# Patient Record
Sex: FEMALE | Race: WHITE | Employment: FULL TIME | URBAN - METROPOLITAN AREA
[De-identification: names, ages, dates, MRNs, and addresses within clinical notes are randomized per-mention and may not be internally consistent; named-entity substitution may affect disease eponyms.]

---

## 2017-12-18 ENCOUNTER — HOSPITAL ENCOUNTER (EMERGENCY)
Age: 47
Discharge: HOME | End: 2017-12-18
Payer: COMMERCIAL

## 2017-12-18 VITALS
OXYGEN SATURATION: 99 % | SYSTOLIC BLOOD PRESSURE: 144 MMHG | DIASTOLIC BLOOD PRESSURE: 90 MMHG | RESPIRATION RATE: 16 BRPM | TEMPERATURE: 97.4 F | HEART RATE: 82 BPM

## 2017-12-18 DIAGNOSIS — R21 RASH AND OTHER NONSPECIFIC SKIN ERUPTION: Primary | ICD-10-CM

## 2017-12-18 PROCEDURE — 99282 EMERGENCY DEPT VISIT SF MDM: CPT

## 2017-12-18 PROCEDURE — 6370000000 HC RX 637 (ALT 250 FOR IP): Performed by: PHYSICIAN ASSISTANT

## 2017-12-18 RX ORDER — PREDNISONE 10 MG/1
40 TABLET ORAL ONCE
Status: COMPLETED | OUTPATIENT
Start: 2017-12-18 | End: 2017-12-18

## 2017-12-18 RX ORDER — DIPHENHYDRAMINE HCL 25 MG
25 CAPSULE ORAL EVERY 6 HOURS PRN
Qty: 30 CAPSULE | Refills: 0 | Status: SHIPPED | OUTPATIENT
Start: 2017-12-18 | End: 2017-12-28

## 2017-12-18 RX ORDER — PREDNISONE 20 MG/1
40 TABLET ORAL DAILY
Qty: 8 TABLET | Refills: 0 | Status: SHIPPED | OUTPATIENT
Start: 2017-12-18 | End: 2017-12-28

## 2017-12-18 RX ADMIN — PREDNISONE 40 MG: 10 TABLET ORAL at 16:52

## 2017-12-18 ASSESSMENT — ENCOUNTER SYMPTOMS
GASTROINTESTINAL NEGATIVE: 1
RESPIRATORY NEGATIVE: 1
EYES NEGATIVE: 1

## 2017-12-18 NOTE — ED TRIAGE NOTES
Pt a/ox3 skin w  D intact pulse strong and regular light pink rash noted on neck and forearm  Pt shows no distress

## 2017-12-18 NOTE — ED PROVIDER NOTES
[address] [facility] ED  eMERGENCY dEPARTMENT eNCOUnter      Pt Name: Ida Spencer  MRN: [id number]  [place] 1970  Date of evaluation: 12/18/2017  Provider: Quentin Kellogg PA-C      HISTORY OF PRESENT ILLNESS    Ida Spencer is a 52 y.o. female who presents to the Emergency Department with Chief complaint of a rash on her neck and bilateral upper extremities. Patient states she did use a new detergent as well as facial lotion prior to onset of rash. She denies any new foods or medications. Patient states she does have a history of eczema and states in the wintertime she would occasionally break out in hives due to dry skin. Patient states she has not had any recent issues with this type of rash. She denies any fever and has no other complaints at this time. REVIEW OF SYSTEMS       Review of Systems   Constitutional: Negative. HENT: Negative. Eyes: Negative. Respiratory: Negative. Cardiovascular: Negative. Gastrointestinal: Negative. Endocrine: Negative. Genitourinary: Negative. Musculoskeletal: Negative. Skin: Positive for rash. Neurological: Negative. Psychiatric/Behavioral: Negative. PAST MEDICAL HISTORY   No past medical history on file. SURGICAL HISTORY     No past surgical history on file. CURRENT MEDICATIONS       Previous Medications    No medications on file       ALLERGIES     Review of patient's allergies indicates no known allergies. FAMILY HISTORY     No family history on file.        SOCIAL HISTORY       Social History     Social History    Marital status:      Spouse name: N/A    Number of children: N/A    Years of education: N/A     Social History Main Topics    Smoking status: Not on file    Smokeless tobacco: Not on file    Alcohol use Not on file    Drug use: Unknown    Sexual activity: Not on file     Other Topics Concern    Not on file     Social History Narrative    No narrative on file SCREENINGS             PHYSICAL EXAM    (up to 7 for level 4, 8 or more for level 5)     ED Triage Vitals [12/18/17 1611]   BP Temp Temp Source Pulse Resp SpO2 Height Weight   (!) 144/90 97.4 °F (36.3 °C) Oral 82 16 99 % -- --       Physical Exam   Constitutional: She is oriented to person, place, and time. She appears well-developed and well-nourished. No distress. HENT:   Head: Normocephalic and atraumatic. Right Ear: Tympanic membrane is not erythematous. Left Ear: Tympanic membrane is not erythematous. Nose: No rhinorrhea. Mouth/Throat: No oropharyngeal exudate, posterior oropharyngeal edema, posterior oropharyngeal erythema or tonsillar abscesses. Eyes: Conjunctivae are normal. Pupils are equal, round, and reactive to light. Neck: Normal range of motion. Neck supple. Cardiovascular: Normal rate and regular rhythm. No murmur heard. Pulmonary/Chest: Breath sounds normal. No respiratory distress. She has no wheezes. She has no rales. Abdominal: Soft. She exhibits no distension. There is no tenderness. Musculoskeletal: Normal range of motion. Neurological: She is alert and oriented to person, place, and time. No cranial nerve deficit. Skin: Skin is warm and dry. No rash noted. There is erythema. Generalized fine erythematous rash to neck and bilateral upper extremities. Patient states it is itching in nature. Psychiatric: She has a normal mood and affect. Judgment normal.         All other labs were within normal range or not returned as of this dictation. EMERGENCY DEPARTMENT COURSE and DIFFERENTIAL DIAGNOSIS/MDM:   Vitals:    Vitals:    12/18/17 1611   BP: (!) 144/90   Pulse: 82   Resp: 16   Temp: 97.4 °F (36.3 °C)   TempSrc: Oral   SpO2: 99%       ED Course        Patient took Benadryl prior to arrival.  She was additionally given prednisone while in the emergency department. She is to remain on these medications for the next few days for treatment.   She is to follow up with family doctor as well as dermatology if symptoms persist.  She is instructed not to use the new detergent or face lotion in case these were the causative agent. Patient verbalizes understanding discharge and as no further questions. PROCEDURES:  Unless otherwise noted below, none     Procedures      FINAL IMPRESSION      1.  Rash and other nonspecific skin eruption          DISPOSITION/PLAN   DISPOSITION Decision to Discharge        Stephen Merritt PA-C (electronically signed)  Attending Emergency Physician  [facility]  12/18/17 1733 Elham Riojas PA-C  12/18/17 [id number]

## 2023-02-04 PROBLEM — N95.1 HOT FLASH, MENOPAUSAL: Status: ACTIVE | Noted: 2023-02-04

## 2023-02-04 PROBLEM — N28.9 KIDNEY LESION: Status: ACTIVE | Noted: 2023-02-04

## 2023-02-04 PROBLEM — L30.9 ECZEMA: Status: ACTIVE | Noted: 2023-02-04

## 2023-02-04 PROBLEM — I70.90 ATHEROSCLEROSIS: Status: ACTIVE | Noted: 2023-02-04

## 2023-02-04 PROBLEM — R73.03 PREDIABETES: Status: ACTIVE | Noted: 2023-02-04

## 2023-02-04 PROBLEM — S31.105A OPEN WOUND OF UMBILICAL REGION: Status: ACTIVE | Noted: 2023-02-04

## 2023-02-04 PROBLEM — Q64.4: Status: ACTIVE | Noted: 2023-02-04

## 2023-02-04 PROBLEM — R07.89 ATYPICAL CHEST PAIN: Status: ACTIVE | Noted: 2023-02-04

## 2023-02-04 PROBLEM — M25.531 ACUTE PAIN OF RIGHT WRIST: Status: ACTIVE | Noted: 2023-02-04

## 2023-02-04 PROBLEM — Q96.9 TURNER SYNDROME (HHS-HCC): Status: ACTIVE | Noted: 2023-02-04

## 2023-02-04 PROBLEM — J43.9 EMPHYSEMA LUNG (MULTI): Status: ACTIVE | Noted: 2023-02-04

## 2023-02-04 PROBLEM — K21.9 GASTROESOPHAGEAL REFLUX DISEASE WITHOUT ESOPHAGITIS: Status: ACTIVE | Noted: 2023-02-04

## 2023-02-04 PROBLEM — F43.20 GRIEF REACTION: Status: ACTIVE | Noted: 2023-02-04

## 2023-02-04 PROBLEM — F43.21 GRIEF REACTION: Status: ACTIVE | Noted: 2023-02-04

## 2023-02-04 PROBLEM — R79.89 ELEVATED LFTS: Status: ACTIVE | Noted: 2023-02-04

## 2023-02-04 PROBLEM — E78.2 MIXED HYPERLIPIDEMIA: Status: ACTIVE | Noted: 2023-02-04

## 2023-02-04 RX ORDER — MOMETASONE FUROATE 1 MG/G
OINTMENT TOPICAL
COMMUNITY
Start: 2020-01-17

## 2023-02-04 RX ORDER — ATORVASTATIN CALCIUM 20 MG/1
40 TABLET, FILM COATED ORAL NIGHTLY
COMMUNITY
Start: 2020-05-18 | End: 2023-08-21 | Stop reason: DRUGHIGH

## 2023-02-04 RX ORDER — DICLOFENAC SODIUM 10 MG/G
GEL TOPICAL
COMMUNITY
End: 2024-04-01 | Stop reason: WASHOUT

## 2023-02-04 RX ORDER — SERTRALINE HYDROCHLORIDE 25 MG/1
1 TABLET, FILM COATED ORAL DAILY
COMMUNITY
Start: 2021-11-09 | End: 2024-02-21 | Stop reason: WASHOUT

## 2023-03-20 ENCOUNTER — OFFICE VISIT (OUTPATIENT)
Dept: PRIMARY CARE | Facility: CLINIC | Age: 53
End: 2023-03-20
Payer: COMMERCIAL

## 2023-03-20 VITALS
TEMPERATURE: 98.4 F | OXYGEN SATURATION: 98 % | DIASTOLIC BLOOD PRESSURE: 60 MMHG | HEIGHT: 62 IN | RESPIRATION RATE: 16 BRPM | WEIGHT: 130 LBS | BODY MASS INDEX: 23.92 KG/M2 | HEART RATE: 71 BPM | SYSTOLIC BLOOD PRESSURE: 128 MMHG

## 2023-03-20 DIAGNOSIS — Q96.9 TURNER SYNDROME (HHS-HCC): ICD-10-CM

## 2023-03-20 DIAGNOSIS — E78.2 MIXED HYPERLIPIDEMIA: ICD-10-CM

## 2023-03-20 DIAGNOSIS — Z12.11 COLON CANCER SCREENING: ICD-10-CM

## 2023-03-20 DIAGNOSIS — R73.03 PREDIABETES: ICD-10-CM

## 2023-03-20 DIAGNOSIS — J43.8 OTHER EMPHYSEMA (MULTI): Primary | ICD-10-CM

## 2023-03-20 PROBLEM — D22.9 ATYPICAL MOLE: Status: ACTIVE | Noted: 2023-03-20

## 2023-03-20 PROBLEM — R19.8 UMBILICAL DISCHARGE: Status: ACTIVE | Noted: 2023-03-20

## 2023-03-20 PROCEDURE — 99214 OFFICE O/P EST MOD 30 MIN: CPT | Performed by: FAMILY MEDICINE

## 2023-03-20 PROCEDURE — 1036F TOBACCO NON-USER: CPT | Performed by: FAMILY MEDICINE

## 2023-03-20 RX ORDER — METFORMIN HYDROCHLORIDE 500 MG/1
500 TABLET ORAL
Qty: 90 TABLET | Refills: 3 | Status: SHIPPED | OUTPATIENT
Start: 2023-03-20 | End: 2024-01-31

## 2023-03-20 NOTE — PROGRESS NOTES
C19: [id number]  Flu: DECLINED  mamm: 11/2021-ordered in [facility]  pap: 2019  lmp: 2016     - Kidney MRI showed 8mm proteinaceous cyst  - Wrist MRI showed tenosynovitis and edema. Wrist pain is nearly gone but still has some swelling. Diclofenac gel helps a little and tylenol helps a lot. Will see ortho on April 3rd for this.  - Gonna see Dr. Morillo (cards) again tomorrow for a holter monitor due to palpitations. Has these while settling into bed at night. Go away on their own after a few minutes.  - Last DEXA was a long time ago. It was normal then. She is not on any hormonal therapy. Not having post menopausal symptoms. No longer following with endocrinology. Not following with obgyn.  - Mammo scheduled for April 11th    [ ] ? Dexa

## 2023-03-20 NOTE — PROGRESS NOTES
Subjective   Patient ID: Gissel Borrero is a 52 y.o. female who presents for Results (Discuss test results - MRI).  C19: [id number]  Flu: DECLINED  mamm: 11/2021-ordered in [facility]  pap: 2019  lmp: 2016      - Kidney MRI showed 8mm proteinaceous cyst  - Wrist MRI showed tenosynovitis and edema. Wrist pain is nearly gone but still has some swelling. Diclofenac gel helps a little and tylenol helps a lot. Will see ortho on April 3rd for this.  - Going to see Dr. Morillo (cards) again tomorrow for a holter monitor due to palpitations. Has these while settling into bed at night. Go away on their own after a few minutes.  - Last DEXA was a long time ago. It was normal then. She is not on any hormonal therapy. Not having post menopausal symptoms. No longer following with endocrinology. Not following with obgyn.  - Mammo scheduled for April 11th  Pt seen and agree w dr jeter findings    HPI  Patient Active Problem List   Diagnosis    Eczema    Elevated LFTs    Gastroesophageal reflux disease without esophagitis    Grief reaction    Hot flash, menopausal    Mixed hyperlipidemia    Open wound of umbilical region    Nicole syndrome    Urachal sinus    Acute pain of right wrist    Atherosclerosis    Atypical chest pain    Emphysema lung (CMS/HCC)    Kidney lesion    Prediabetes    Atypical mole    B12 deficiency    Gonadal dysgenesis    Migraines    Umbilical discharge    Vitamin D deficiency       Past Surgical History:   Procedure Laterality Date    PELVIC LAPAROSCOPY  11/18/2022    Laparoscopy       Review of Systems  This patient has   NO history of recent Covid nor flu symptoms,  NO Fever nor chills,  NO Chest pain, shortness of breath nor paroxysmal nocturnal dyspnea,  NO Nausea, vomiting, nor diarrhea,  NO Hematochezia nor melena,  NO Dysuria, hematuria, nor new incontinence issues  NO new severe headaches nor neurological complaints,  NO new issues with anxiety nor depression nor new psychiatric complaints,  NO suicidal nor  "homicidal ideations.     OBJECTIVE:  /60 (BP Location: Left arm, Patient Position: Sitting, BP Cuff Size: Adult)   Pulse 71   Temp 36.9 °C (98.4 °F) (Temporal)   Resp 16   Ht 1.575 m (5' 2\")   Wt 59 kg (130 lb)   SpO2 98%   BMI 23.78 kg/m²      General:  alert, oriented, no acute distress.  No obvious skin rashes noted.   No gait disturbance noted.    Mood is pleasant, not tearful, no signs of emotional distress.  Not appearing intoxicated or altered.   No voiced delusions,   Normal, appropriate behavior.    HEENT: Normocephalic, atraumatic,   Pupils round, reactive to light  Extraocular motions intact and wnl  Tympanic membranes normal    Neck: no nuchal rigidity  No masses palpable.  No carotid bruits.  No thyromegaly.    Respiratory: Equal breath sounds  No wheezes,    rales,    nor rhonchi  No respiratory distress.    Heart: Regular rate and rhythm, no    murmurs  no rubs/gallops    Abdomen: no masses palpable, no rebound nor guarding, no rebound nor guarding.    Extremities: NO cyanosis noted, no clubbing.   No edema noted.  2+dorsalis pedis pulses.    Normal-not antalgic, steady gait.    Legacy Encounter on 01/27/2023   Component Date Value Ref Range Status    Glucose 01/27/2023 80  74 - 99 mg/dL Final    Sodium 01/27/2023 141  136 - 145 mmol/L Final    Potassium 01/27/2023 4.2  3.5 - 5.3 mmol/L Final    Chloride 01/27/2023 106  98 - 107 mmol/L Final    Bicarbonate 01/27/2023 27  21 - 32 mmol/L Final    Anion Gap 01/27/2023 12  10 - 20 mmol/L Final    Urea Nitrogen 01/27/2023 15  6 - 23 mg/dL Final    Creatinine 01/27/2023 0.59  0.50 - 1.05 mg/dL Final    GFR Female 01/27/2023 >90  >90 mL/min/1.73m2 Final    Comment:  CALCULATIONS OF ESTIMATED GFR ARE PERFORMED   USING THE 2021 CKD-EPI STUDY REFIT EQUATION   WITHOUT THE RACE VARIABLE FOR THE IDMS-TRACEABLE   CREATININE METHODS.    https://jasn.asnjournals.org/content/early/2021/09/22/ASN.4607510662      Calcium 01/27/2023 9.3  8.6 - 10.3 mg/dL " Final    Albumin 01/27/2023 4.4  3.4 - 5.0 g/dL Final    Alkaline Phosphatase 01/27/2023 120 (H)  33 - 110 U/L Final    Total Protein 01/27/2023 6.9  6.4 - 8.2 g/dL Final    AST 01/27/2023 16  9 - 39 U/L Final    Total Bilirubin 01/27/2023 0.5  0.0 - 1.2 mg/dL Final    ALT (SGPT) 01/27/2023 17  7 - 45 U/L Final    Comment:  Patients treated with Sulfasalazine may generate    falsely decreased results for ALT.      WBC 01/27/2023 5.9  4.4 - 11.3 x10E9/L Final    RBC 01/27/2023 4.47  4.00 - 5.20 x10E12/L Final    Hemoglobin 01/27/2023 12.5  12.0 - 16.0 g/dL Final    Hematocrit 01/27/2023 39.8  36.0 - 46.0 % Final    MCV 01/27/2023 89  80 - 100 fL Final    MCHC 01/27/2023 31.4 (L)  32.0 - 36.0 g/dL Final    Platelets 01/27/2023 261  150 - 450 x10E9/L Final    RDW 01/27/2023 14.3  11.5 - 14.5 % Final    Uric Acid 01/27/2023 4.3  2.3 - 6.7 mg/dL Final    Comment:  Venipuncture immediately after or during the    administration of Metamizole may lead to falsely   low results. Testing should be performed immediately   prior to Metamizole dosing.      Rheumatoid Factor 01/27/2023 11  0 - 15 IU/mL Final    ANTI-NUCLEAR ANTIBODY (GARRETT) 01/27/2023 NEGATIVE  NEGATIVE Final    Comment:   The Antinuclear Antibody (GARRETT) test was performed using    indirect immunofluorescence assay with HEp-2 cells slide.      Sedimentation Rate 01/27/2023 10  0 - 30 mm/h Final    Comment: Please note new reference ranges as of 5/9/2022.  Ran on alternate instrument    Reference Ranges:    Males: 0-15  Females: 0-20  Children under 18: 0-10      Hemoglobin A1C 01/27/2023 5.9 (A)  % Final    Comment:      Diagnosis of Diabetes-Adults   Non-Diabetic: < or = 5.6%   Increased risk for developing diabetes: 5.7-6.4%   Diagnostic of diabetes: > or = 6.5%  .       Monitoring of Diabetes                Age (y)     Therapeutic Goal (%)   Adults:          >18           <7.0   Pediatrics:    13-18           <7.5                   7-12           <8.0                    0- 6            7.5-8.5   American Diabetes Association. Diabetes Care 33(S1), Jan 2010.      Estimated Average Glucose 01/27/2023 123  MG/DL Final    Cholesterol 01/27/2023 209 (H)  0 - 199 mg/dL Final    Comment: .      AGE      DESIRABLE   BORDERLINE HIGH   HIGH     0-19 Y     0 - 169       170 - 199     >/= 200    20-24 Y     0 - 189       190 - 224     >/= 225         >24 Y     0 - 199       200 - 239     >/= 240   **All ranges are based on fasting samples. Specific   therapeutic targets will vary based on patient-specific   cardiac risk.  .   Pediatric guidelines reference:Pediatrics 2011, 128(S5).   Adult guidelines reference: NCEP ATPIII Guidelines,     RACHEL 2001, 258:5164-97  .   Venipuncture immediately after or during the    administration of Metamizole may lead to falsely   low results. Testing should be performed immediately   prior to Metamizole dosing.      HDL 01/27/2023 58.1  mg/dL Final    Comment: .      AGE      VERY LOW   LOW     NORMAL    HIGH       0-19 Y       < 35   < 40     40-45     ----    20-24 Y       ----   < 40       >45     ----      >24 Y       ----   < 40     40-60      >60  .      Cholesterol/HDL Ratio 01/27/2023 3.6   Final    Comment: REF VALUES  DESIRABLE  < 3.4  HIGH RISK  > 5.0      LDL 01/27/2023 125 (H)  0 - 99 mg/dL Final    Comment: .                           NEAR      BORD      AGE      DESIRABLE  OPTIMAL    HIGH     HIGH     VERY HIGH     0-19 Y     0 - 109     ---    110-129   >/= 130     ----    20-24 Y     0 - 119     ---    120-159   >/= 160     ----      >24 Y     0 -  99   100-129  130-159   160-189     >/=190  .      VLDL 01/27/2023 26  0 - 40 mg/dL Final    Triglycerides 01/27/2023 132  0 - 149 mg/dL Final    Comment: .      AGE      DESIRABLE   BORDERLINE HIGH   HIGH     VERY HIGH   0 D-90 D    19 - 174         ----         ----        ----  91 D- 9 Y     0 -  74        75 -  99     >/= 100      ----    10-19 Y     0 -  89        90 - 129     >/= 130       ----    20-24 Y     0 - 114       115 - 149     >/= 150      ----         >24 Y     0 - 149       150 - 199    200- 499    >/= 500  .   Venipuncture immediately after or during the    administration of Metamizole may lead to falsely   low results. Testing should be performed immediately   prior to Metamizole dosing.      A-1 Antitrypsin 01/27/2023 124  84 - 218 mg/dL Final        Assessment/Plan     Problem List Items Addressed This Visit          Respiratory    Emphysema lung (CMS/HCC) - Primary       Endocrine/Metabolic    Prediabetes       Other    Mixed hyperlipidemia    Nicole syndrome     Other Visit Diagnoses       Colon cancer screening        Relevant Orders    Cologuard® colon cancer screening            Mamm scheduled  Cologuard due OR scope-offered  Low carb diet    Offered metformin  Palpitations-having holter tomorrow   And follow up cardio 4-10  -6mo hba1c cmp  Having ccs likely soon  Ordered by cardio  Consider pulmonary  See me 4-6mo

## 2023-04-10 ENCOUNTER — TELEPHONE (OUTPATIENT)
Dept: PRIMARY CARE | Facility: CLINIC | Age: 53
End: 2023-04-10
Payer: COMMERCIAL

## 2023-04-13 LAB
ANION GAP IN SER/PLAS: 12 MMOL/L (ref 10–20)
CALCIUM (MG/DL) IN SER/PLAS: 8.7 MG/DL (ref 8.6–10.3)
CARBON DIOXIDE, TOTAL (MMOL/L) IN SER/PLAS: 30 MMOL/L (ref 21–32)
CHLORIDE (MMOL/L) IN SER/PLAS: 105 MMOL/L (ref 98–107)
CREATININE (MG/DL) IN SER/PLAS: 0.61 MG/DL (ref 0.5–1.05)
GFR FEMALE: >90 ML/MIN/1.73M2
GLUCOSE (MG/DL) IN SER/PLAS: 86 MG/DL (ref 74–99)
MAGNESIUM (MG/DL) IN SER/PLAS: 1.71 MG/DL (ref 1.6–2.4)
POTASSIUM (MMOL/L) IN SER/PLAS: 3.7 MMOL/L (ref 3.5–5.3)
SODIUM (MMOL/L) IN SER/PLAS: 143 MMOL/L (ref 136–145)
THYROTROPIN (MIU/L) IN SER/PLAS BY DETECTION LIMIT <= 0.05 MIU/L: 0.8 MIU/L (ref 0.44–3.98)
THYROXINE (T4) FREE (NG/DL) IN SER/PLAS: 0.99 NG/DL (ref 0.61–1.12)
UREA NITROGEN (MG/DL) IN SER/PLAS: 13 MG/DL (ref 6–23)

## 2023-08-01 PROBLEM — D22.9 ATYPICAL MOLE: Status: RESOLVED | Noted: 2023-03-20 | Resolved: 2023-08-01

## 2023-08-01 PROBLEM — M25.531 ACUTE PAIN OF RIGHT WRIST: Status: RESOLVED | Noted: 2023-02-04 | Resolved: 2023-08-01

## 2023-08-01 PROBLEM — R07.89 ATYPICAL CHEST PAIN: Status: RESOLVED | Noted: 2023-02-04 | Resolved: 2023-08-01

## 2023-08-01 PROBLEM — S31.105A OPEN WOUND OF UMBILICAL REGION: Status: RESOLVED | Noted: 2023-02-04 | Resolved: 2023-08-01

## 2023-08-17 LAB
ALANINE AMINOTRANSFERASE (SGPT) (U/L) IN SER/PLAS: 17 U/L (ref 7–45)
ALBUMIN (G/DL) IN SER/PLAS: 4.5 G/DL (ref 3.4–5)
ALKALINE PHOSPHATASE (U/L) IN SER/PLAS: 120 U/L (ref 33–110)
ANION GAP IN SER/PLAS: 13 MMOL/L (ref 10–20)
ASPARTATE AMINOTRANSFERASE (SGOT) (U/L) IN SER/PLAS: 19 U/L (ref 9–39)
BASOPHILS (10*3/UL) IN BLOOD BY AUTOMATED COUNT: 0.06 X10E9/L (ref 0–0.1)
BASOPHILS/100 LEUKOCYTES IN BLOOD BY AUTOMATED COUNT: 0.9 % (ref 0–2)
BILIRUBIN TOTAL (MG/DL) IN SER/PLAS: 0.7 MG/DL (ref 0–1.2)
CALCIUM (MG/DL) IN SER/PLAS: 9.4 MG/DL (ref 8.6–10.3)
CARBON DIOXIDE, TOTAL (MMOL/L) IN SER/PLAS: 28 MMOL/L (ref 21–32)
CHLORIDE (MMOL/L) IN SER/PLAS: 103 MMOL/L (ref 98–107)
CHOLESTEROL (MG/DL) IN SER/PLAS: 173 MG/DL (ref 0–199)
CHOLESTEROL IN HDL (MG/DL) IN SER/PLAS: 56.8 MG/DL
CHOLESTEROL/HDL RATIO: 3
CREATININE (MG/DL) IN SER/PLAS: 0.68 MG/DL (ref 0.5–1.05)
EOSINOPHILS (10*3/UL) IN BLOOD BY AUTOMATED COUNT: 0.09 X10E9/L (ref 0–0.7)
EOSINOPHILS/100 LEUKOCYTES IN BLOOD BY AUTOMATED COUNT: 1.3 % (ref 0–6)
ERYTHROCYTE DISTRIBUTION WIDTH (RATIO) BY AUTOMATED COUNT: 14.1 % (ref 11.5–14.5)
ERYTHROCYTE MEAN CORPUSCULAR HEMOGLOBIN CONCENTRATION (G/DL) BY AUTOMATED: 31.7 G/DL (ref 32–36)
ERYTHROCYTE MEAN CORPUSCULAR VOLUME (FL) BY AUTOMATED COUNT: 90 FL (ref 80–100)
ERYTHROCYTES (10*6/UL) IN BLOOD BY AUTOMATED COUNT: 4.54 X10E12/L (ref 4–5.2)
ESTIMATED AVERAGE GLUCOSE FOR HBA1C: 117 MG/DL
GFR FEMALE: >90 ML/MIN/1.73M2
GLUCOSE (MG/DL) IN SER/PLAS: 83 MG/DL (ref 74–99)
HEMATOCRIT (%) IN BLOOD BY AUTOMATED COUNT: 40.7 % (ref 36–46)
HEMOGLOBIN (G/DL) IN BLOOD: 12.9 G/DL (ref 12–16)
HEMOGLOBIN A1C/HEMOGLOBIN TOTAL IN BLOOD: 5.7 %
IMMATURE GRANULOCYTES/100 LEUKOCYTES IN BLOOD BY AUTOMATED COUNT: 0.1 % (ref 0–0.9)
LDL: 93 MG/DL (ref 0–99)
LEUKOCYTES (10*3/UL) IN BLOOD BY AUTOMATED COUNT: 6.7 X10E9/L (ref 4.4–11.3)
LYMPHOCYTES (10*3/UL) IN BLOOD BY AUTOMATED COUNT: 1.17 X10E9/L (ref 1.2–4.8)
LYMPHOCYTES/100 LEUKOCYTES IN BLOOD BY AUTOMATED COUNT: 17.5 % (ref 13–44)
MONOCYTES (10*3/UL) IN BLOOD BY AUTOMATED COUNT: 0.65 X10E9/L (ref 0.1–1)
MONOCYTES/100 LEUKOCYTES IN BLOOD BY AUTOMATED COUNT: 9.7 % (ref 2–10)
NEUTROPHILS (10*3/UL) IN BLOOD BY AUTOMATED COUNT: 4.71 X10E9/L (ref 1.2–7.7)
NEUTROPHILS/100 LEUKOCYTES IN BLOOD BY AUTOMATED COUNT: 70.5 % (ref 40–80)
PLATELETS (10*3/UL) IN BLOOD AUTOMATED COUNT: 261 X10E9/L (ref 150–450)
POTASSIUM (MMOL/L) IN SER/PLAS: 4 MMOL/L (ref 3.5–5.3)
PROTEIN TOTAL: 7.4 G/DL (ref 6.4–8.2)
SODIUM (MMOL/L) IN SER/PLAS: 140 MMOL/L (ref 136–145)
TRIGLYCERIDE (MG/DL) IN SER/PLAS: 115 MG/DL (ref 0–149)
UREA NITROGEN (MG/DL) IN SER/PLAS: 15 MG/DL (ref 6–23)
VLDL: 23 MG/DL (ref 0–40)

## 2023-08-21 ENCOUNTER — OFFICE VISIT (OUTPATIENT)
Dept: PRIMARY CARE | Facility: CLINIC | Age: 53
End: 2023-08-21
Payer: COMMERCIAL

## 2023-08-21 VITALS
TEMPERATURE: 97.1 F | BODY MASS INDEX: 23.55 KG/M2 | RESPIRATION RATE: 16 BRPM | HEIGHT: 62 IN | DIASTOLIC BLOOD PRESSURE: 78 MMHG | SYSTOLIC BLOOD PRESSURE: 128 MMHG | WEIGHT: 128 LBS | OXYGEN SATURATION: 98 % | HEART RATE: 64 BPM

## 2023-08-21 DIAGNOSIS — R73.03 PREDIABETES: ICD-10-CM

## 2023-08-21 DIAGNOSIS — R82.90 ABNORMAL URINE ODOR: Primary | ICD-10-CM

## 2023-08-21 DIAGNOSIS — E78.2 MIXED HYPERLIPIDEMIA: ICD-10-CM

## 2023-08-21 DIAGNOSIS — Q96.9 TURNER SYNDROME (HHS-HCC): ICD-10-CM

## 2023-08-21 DIAGNOSIS — J43.8 OTHER EMPHYSEMA (MULTI): ICD-10-CM

## 2023-08-21 DIAGNOSIS — Q96.9: ICD-10-CM

## 2023-08-21 DIAGNOSIS — K21.9 GASTROESOPHAGEAL REFLUX DISEASE WITHOUT ESOPHAGITIS: ICD-10-CM

## 2023-08-21 PROCEDURE — 1036F TOBACCO NON-USER: CPT | Performed by: FAMILY MEDICINE

## 2023-08-21 PROCEDURE — 99214 OFFICE O/P EST MOD 30 MIN: CPT | Performed by: FAMILY MEDICINE

## 2023-08-21 RX ORDER — ATORVASTATIN CALCIUM 40 MG/1
40 TABLET, FILM COATED ORAL NIGHTLY
COMMUNITY
End: 2024-05-10 | Stop reason: SDUPTHER

## 2023-08-21 RX ORDER — METOPROLOL SUCCINATE 25 MG/1
25 TABLET, EXTENDED RELEASE ORAL DAILY
COMMUNITY
End: 2024-04-01 | Stop reason: SDUPTHER

## 2023-08-21 NOTE — PROGRESS NOTES
"Subjective   Patient ID: Gissel Borrero is a 52 y.o. female who presents for Prediabetes and Hyperlipidemia.  Covid vax: x 5  CRC: declined  Mammogram: 4/2023  Pap: 7/2019  Lmp: n/a-turners  Just came back from [place]    HPI  Patient Active Problem List   Diagnosis    Eczema    Elevated LFTs    Gastroesophageal reflux disease without esophagitis    Grief reaction    Hot flash, menopausal    Mixed hyperlipidemia    Nicole syndrome    Urachal sinus    Atherosclerosis    Emphysema lung (CMS/HCC)    Kidney lesion    Prediabetes    B12 deficiency    Gonadal dysgenesis    Migraines    Umbilical discharge    Vitamin D deficiency       Past Surgical History:   Procedure Laterality Date    PELVIC LAPAROSCOPY  11/18/2022    Laparoscopy       Review of Systems  This patient has   NO history of recent Covid nor flu symptoms,  NO Fever nor chills,  NO Chest pain, shortness of breath nor paroxysmal nocturnal dyspnea,  NO Nausea, vomiting, nor diarrhea,  NO Hematochezia nor melena,  NO Dysuria, hematuria, nor new incontinence issues  NO new severe headaches nor neurological complaints,  NO new issues with anxiety nor depression nor new psychiatric complaints,  NO suicidal nor homicidal ideations.     OBJECTIVE:  /78   Pulse 64   Temp 36.2 °C (97.1 °F) (Temporal)   Resp 16   Ht 1.575 m (5' 2\")   Wt 58.1 kg (128 lb)   LMP  (LMP Unknown) Comment: Nicole's syndrome  SpO2 98%   BMI 23.41 kg/m²      General:  alert, oriented, no acute distress.  No obvious skin rashes noted.   No gait disturbance noted.    Mood is pleasant, not tearful, no signs of emotional distress.  Not appearing intoxicated or altered.   No voiced delusions,   Normal, appropriate behavior.    HEENT: Normocephalic, atraumatic,   Pupils round, reactive to light  Extraocular motions intact and wnl  Tympanic membranes normal    Neck: no nuchal rigidity  No masses palpable.  No carotid bruits.  No thyromegaly.    Respiratory: Equal breath sounds  No wheezes,    " rales,    nor rhonchi  No respiratory distress.    Heart: Regular rate and rhythm, no    murmurs  no rubs/gallops    Abdomen: no masses palpable, nontender, no rebound nor guarding.    Extremities: NO cyanosis noted, no clubbing.   No edema noted.  2+dorsalis pedis pulses.    Normal-not antalgic, steady gait.    Orders Only on 08/17/2023   Component Date Value Ref Range Status    Hemoglobin A1C 08/17/2023 5.7 (A)  % Final    Comment:      Diagnosis of Diabetes-Adults   Non-Diabetic: < or = 5.6%   Increased risk for developing diabetes: 5.7-6.4%   Diagnostic of diabetes: > or = 6.5%  .       Monitoring of Diabetes                Age (y)     Therapeutic Goal (%)   Adults:          >18           <7.0   Pediatrics:    13-18           <7.5                   7-12           <8.0                   0- 6            7.5-8.5   American Diabetes Association. Diabetes Care 33(S1), Jan 2010.      Estimated Average Glucose 08/17/2023 117  MG/DL Final    WBC 08/17/2023 6.7  4.4 - 11.3 x10E9/L Final    RBC 08/17/2023 4.54  4.00 - 5.20 x10E12/L Final    Hemoglobin 08/17/2023 12.9  12.0 - 16.0 g/dL Final    Hematocrit 08/17/2023 40.7  36.0 - 46.0 % Final    MCV 08/17/2023 90  80 - 100 fL Final    MCHC 08/17/2023 31.7 (L)  32.0 - 36.0 g/dL Final    Platelets 08/17/2023 261  150 - 450 x10E9/L Final    RDW 08/17/2023 14.1  11.5 - 14.5 % Final    Neutrophils % 08/17/2023 70.5  40.0 - 80.0 % Final    Immature Granulocytes %, Automated 08/17/2023 0.1  0.0 - 0.9 % Final    Comment:  Immature Granulocyte Count (IG) includes promyelocytes,    myelocytes and metamyelocytes but does not include bands.   Percent differential counts (%) should be interpreted in the   context of the absolute cell counts (cells/L).      Lymphocytes % 08/17/2023 17.5  13.0 - 44.0 % Final    Monocytes % 08/17/2023 9.7  2.0 - 10.0 % Final    Eosinophils % 08/17/2023 1.3  0.0 - 6.0 % Final    Basophils % 08/17/2023 0.9  0.0 - 2.0 % Final    Neutrophils Absolute  08/17/2023 4.71  1.20 - 7.70 x10E9/L Final    Lymphocytes Absolute 08/17/2023 1.17 (L)  1.20 - 4.80 x10E9/L Final    Monocytes Absolute 08/17/2023 0.65  0.10 - 1.00 x10E9/L Final    Eosinophils Absolute 08/17/2023 0.09  0.00 - 0.70 x10E9/L Final    Basophils Absolute 08/17/2023 0.06  0.00 - 0.10 x10E9/L Final    Cholesterol 08/17/2023 173  0 - 199 mg/dL Final    Comment: .      AGE      DESIRABLE   BORDERLINE HIGH   HIGH     0-19 Y     0 - 169       170 - 199     >/= 200    20-24 Y     0 - 189       190 - 224     >/= 225         >24 Y     0 - 199       200 - 239     >/= 240   **All ranges are based on fasting samples. Specific   therapeutic targets will vary based on patient-specific   cardiac risk.  .   Pediatric guidelines reference:Pediatrics 2011, 128(S5).   Adult guidelines reference: NCEP ATPIII Guidelines,     RACHEL 2001, 258:2486-97  .   Venipuncture immediately after or during the    administration of Metamizole may lead to falsely   low results. Testing should be performed immediately   prior to Metamizole dosing.      HDL 08/17/2023 56.8  mg/dL Final    Comment: .      AGE      VERY LOW   LOW     NORMAL    HIGH       0-19 Y       < 35   < 40     40-45     ----    20-24 Y       ----   < 40       >45     ----      >24 Y       ----   < 40     40-60      >60  .      Cholesterol/HDL Ratio 08/17/2023 3.0   Final    Comment: REF VALUES  DESIRABLE  < 3.4  HIGH RISK  > 5.0      LDL 08/17/2023 93  0 - 99 mg/dL Final    Comment: .                           NEAR      BORD      AGE      DESIRABLE  OPTIMAL    HIGH     HIGH     VERY HIGH     0-19 Y     0 - 109     ---    110-129   >/= 130     ----    20-24 Y     0 - 119     ---    120-159   >/= 160     ----      >24 Y     0 -  99   100-129  130-159   160-189     >/=190  .      VLDL 08/17/2023 23  0 - 40 mg/dL Final    Triglycerides 08/17/2023 115  0 - 149 mg/dL Final    Comment: .      AGE      DESIRABLE   BORDERLINE HIGH   HIGH     VERY HIGH   0 D-90 D    19 - 174          ----         ----        ----  91 D- 9 Y     0 -  74        75 -  99     >/= 100      ----    10-19 Y     0 -  89        90 - 129     >/= 130      ----    20-24 Y     0 - 114       115 - 149     >/= 150      ----         >24 Y     0 - 149       150 - 199    200- 499    >/= 500  .   Venipuncture immediately after or during the    administration of Metamizole may lead to falsely   low results. Testing should be performed immediately   prior to Metamizole dosing.      Glucose 08/17/2023 83  74 - 99 mg/dL Final    Sodium 08/17/2023 140  136 - 145 mmol/L Final    Potassium 08/17/2023 4.0  3.5 - 5.3 mmol/L Final    Chloride 08/17/2023 103  98 - 107 mmol/L Final    Bicarbonate 08/17/2023 28  21 - 32 mmol/L Final    Anion Gap 08/17/2023 13  10 - 20 mmol/L Final    Urea Nitrogen 08/17/2023 15  6 - 23 mg/dL Final    Creatinine 08/17/2023 0.68  0.50 - 1.05 mg/dL Final    GFR Female 08/17/2023 >90  >90 mL/min/1.73m2 Final    Comment:  CALCULATIONS OF ESTIMATED GFR ARE PERFORMED   USING THE 2021 CKD-EPI STUDY REFIT EQUATION   WITHOUT THE RACE VARIABLE FOR THE IDMS-TRACEABLE   CREATININE METHODS.    https://jasn.asnjournals.org/content/early/2021/09/22/ASN.4927125658      Calcium 08/17/2023 9.4  8.6 - 10.3 mg/dL Final    Albumin 08/17/2023 4.5  3.4 - 5.0 g/dL Final    Alkaline Phosphatase 08/17/2023 120 (H)  33 - 110 U/L Final    Total Protein 08/17/2023 7.4  6.4 - 8.2 g/dL Final    AST 08/17/2023 19  9 - 39 U/L Final    Total Bilirubin 08/17/2023 0.7  0.0 - 1.2 mg/dL Final    ALT (SGPT) 08/17/2023 17  7 - 45 U/L Final    Comment:  Patients treated with Sulfasalazine may generate    falsely decreased results for ALT.          Assessment/Plan     Problem List Items Addressed This Visit       Gastroesophageal reflux disease without esophagitis    Mixed hyperlipidemia    Nicole syndrome    Emphysema lung (CMS/HCC)    Prediabetes    Gonadal dysgenesis     6mo in office hba1c  Other labs GOOD  Mood is good-stable will taper off  zoloft-wants to come off if possible  If signs-sxs to continue d/w pt -she will    Ccs -offered cardio-sees qyr    Staying on 1/2 metformin every day doing well

## 2023-08-24 ENCOUNTER — LAB (OUTPATIENT)
Dept: LAB | Facility: LAB | Age: 53
End: 2023-08-24
Payer: COMMERCIAL

## 2023-08-24 DIAGNOSIS — R82.90 ABNORMAL URINE ODOR: ICD-10-CM

## 2023-08-24 LAB
APPEARANCE, URINE: CLEAR
BILIRUBIN, URINE: NEGATIVE
BLOOD, URINE: NEGATIVE
COLOR, URINE: ABNORMAL
GLUCOSE, URINE: NEGATIVE MG/DL
HYALINE CASTS, URINE: ABNORMAL /LPF
KETONES, URINE: NEGATIVE MG/DL
LEUKOCYTE ESTERASE, URINE: ABNORMAL
MUCUS, URINE: ABNORMAL /LPF
NITRITE, URINE: NEGATIVE
PH, URINE: 6 (ref 5–8)
PROTEIN, URINE: NEGATIVE MG/DL
RBC, URINE: ABNORMAL /HPF (ref 0–5)
SPECIFIC GRAVITY, URINE: 1.01 (ref 1–1.03)
SQUAMOUS EPITHELIAL CELLS, URINE: <1 /HPF
UROBILINOGEN, URINE: <2 MG/DL (ref 0–1.9)
WBC, URINE: 2 /HPF (ref 0–5)

## 2023-08-24 PROCEDURE — 81001 URINALYSIS AUTO W/SCOPE: CPT

## 2024-01-31 DIAGNOSIS — R73.03 PREDIABETES: ICD-10-CM

## 2024-01-31 RX ORDER — METFORMIN HYDROCHLORIDE 500 MG/1
500 TABLET ORAL DAILY
Qty: 90 TABLET | Refills: 3 | Status: SHIPPED | OUTPATIENT
Start: 2024-01-31

## 2024-02-21 ENCOUNTER — OFFICE VISIT (OUTPATIENT)
Dept: PRIMARY CARE | Facility: CLINIC | Age: 54
End: 2024-02-21
Payer: COMMERCIAL

## 2024-02-21 VITALS
HEIGHT: 62 IN | BODY MASS INDEX: 23.74 KG/M2 | OXYGEN SATURATION: 95 % | WEIGHT: 129 LBS | TEMPERATURE: 97.4 F | HEART RATE: 76 BPM | RESPIRATION RATE: 16 BRPM | SYSTOLIC BLOOD PRESSURE: 124 MMHG | DIASTOLIC BLOOD PRESSURE: 62 MMHG

## 2024-02-21 DIAGNOSIS — Q96.9 TURNER SYNDROME (HHS-HCC): ICD-10-CM

## 2024-02-21 DIAGNOSIS — E53.8 VITAMIN B12 DEFICIENCY: Primary | ICD-10-CM

## 2024-02-21 DIAGNOSIS — J43.8 OTHER EMPHYSEMA (MULTI): ICD-10-CM

## 2024-02-21 DIAGNOSIS — E78.2 MIXED HYPERLIPIDEMIA: ICD-10-CM

## 2024-02-21 DIAGNOSIS — R73.03 PREDIABETES: ICD-10-CM

## 2024-02-21 DIAGNOSIS — Z23 NEED FOR VACCINATION: ICD-10-CM

## 2024-02-21 DIAGNOSIS — Z12.31 ENCOUNTER FOR SCREENING MAMMOGRAM FOR MALIGNANT NEOPLASM OF BREAST: ICD-10-CM

## 2024-02-21 LAB — POC HEMOGLOBIN A1C: 6 % (ref 4.2–6.5)

## 2024-02-21 PROCEDURE — 1036F TOBACCO NON-USER: CPT | Performed by: FAMILY MEDICINE

## 2024-02-21 PROCEDURE — 90480 ADMN SARSCOV2 VAC 1/ONLY CMP: CPT | Performed by: FAMILY MEDICINE

## 2024-02-21 PROCEDURE — 99214 OFFICE O/P EST MOD 30 MIN: CPT | Performed by: FAMILY MEDICINE

## 2024-02-21 PROCEDURE — 96372 THER/PROPH/DIAG INJ SC/IM: CPT | Performed by: FAMILY MEDICINE

## 2024-02-21 PROCEDURE — 91320 SARSCV2 VAC 30MCG TRS-SUC IM: CPT | Performed by: FAMILY MEDICINE

## 2024-02-21 PROCEDURE — 83036 HEMOGLOBIN GLYCOSYLATED A1C: CPT | Performed by: FAMILY MEDICINE

## 2024-02-21 RX ORDER — CYANOCOBALAMIN 1000 UG/ML
1000 INJECTION, SOLUTION INTRAMUSCULAR; SUBCUTANEOUS ONCE
Status: COMPLETED | OUTPATIENT
Start: 2024-02-21 | End: 2024-02-21

## 2024-02-21 RX ADMIN — CYANOCOBALAMIN 1000 MCG: 1000 INJECTION, SOLUTION INTRAMUSCULAR; SUBCUTANEOUS at 15:29

## 2024-02-21 NOTE — PROGRESS NOTES
Covid vax: x 5  Flu: declined  Tdap: advised  Shingles: advised    CRC: declined  Mammogram: 4/2023-ordered  Pap: 7/2019-agrees next visit  Lmp: n/a

## 2024-02-21 NOTE — PROGRESS NOTES
"Subjective   Patient ID: Gissel Borrero is a 53 y.o. female who presents for Prediabetes and Hyperlipidemia.  Covid vax: x 5  Flu: declined  Tdap: advised  Shingles: advised     CRC: declined  Mammogram: 4/2023-ordered  Pap: 7/2019-agrees next visit  Lmp: n/a   hba1c 6  Needs low carb diet    HPI  Patient Active Problem List   Diagnosis    Eczema    Elevated LFTs    Gastroesophageal reflux disease without esophagitis    Grief reaction    Hot flash, menopausal    Mixed hyperlipidemia    Nicole syndrome    Urachal sinus    Atherosclerosis    Emphysema lung (CMS/HCC)    Kidney lesion    Prediabetes    B12 deficiency    Gonadal dysgenesis    Migraines    Umbilical discharge    Vitamin D deficiency       Past Surgical History:   Procedure Laterality Date    PELVIC LAPAROSCOPY         Review of Systems  This patient has  NO history of seizures/ CAD or CVA    NO history of recent Covid nor flu symptoms,  NO Fever nor chills,  NO Chest pain, shortness of breath nor paroxysmal nocturnal dyspnea,  NO Nausea, vomiting, nor diarrhea,  NO Hematochezia nor melena,  NO Dysuria, hematuria, nor new incontinence issues  NO new severe headaches nor neurological complaints,  NO new issues with anxiety nor depression nor new psychiatric complaints,  NO suicidal nor homicidal ideations.     OBJECTIVE:  /62   Pulse 76   Temp 36.3 °C (97.4 °F) (Temporal)   Resp 16   Ht 1.575 m (5' 2\")   Wt 58.5 kg (129 lb)   SpO2 95%   BMI 23.59 kg/m²      General:  alert, oriented, no acute distress.  No obvious skin rashes noted.   No gait disturbance noted.    Mood is pleasant,  no signs of emotional distress.   Not appearing intoxicated or altered.   No voiced delusions,   Normal, appropriate behavior.    HEENT: Normocephalic, atraumatic,   Pupils round, reactive to light  Extraocular motions intact and wnl  Tympanic membranes normal    Neck: no nuchal rigidity  No masses palpable.  No carotid bruits.  No thyromegaly.    Respiratory: Equal " breath sounds  No wheezes,    rales,    nor rhonchi  No respiratory distress.    Heart: Regular rate and rhythm, no    murmurs  no rubs/gallops    Abdomen: no masses palpable, nontender, no rebound nor guarding.    Extremities: NO cyanosis noted, no clubbing.   No edema noted.  2+dorsalis pedis pulses.    Normal-not antalgic, steady gait.    No visits with results within 3 Month(s) from this visit.   Latest known visit with results is:   Lab on 08/24/2023   Component Date Value Ref Range Status    Color, Urine 08/24/2023 STRAW  STRAW,YELLOW Final    Appearance, Urine 08/24/2023 CLEAR  CLEAR Final    Specific Gravity, Urine 08/24/2023 1.009  1.005 - 1.035 Final    pH, Urine 08/24/2023 6.0  5.0 - 8.0 Final    Protein, Urine 08/24/2023 NEGATIVE  NEGATIVE mg/dL Final    Glucose, Urine 08/24/2023 NEGATIVE  NEGATIVE mg/dL Final    Blood, Urine 08/24/2023 NEGATIVE  NEGATIVE Final    Ketones, Urine 08/24/2023 NEGATIVE  NEGATIVE mg/dL Final    Bilirubin, Urine 08/24/2023 NEGATIVE  NEGATIVE Final    Urobilinogen, Urine 08/24/2023 <2.0  0.0 - 1.9 mg/dL Final    Nitrite, Urine 08/24/2023 NEGATIVE  NEGATIVE Final    Leukocyte Esterase, Urine 08/24/2023 TRACE (A)  NEGATIVE Final    WBC, Urine 08/24/2023 2  0 - 5 /HPF Final    RBC, Urine 08/24/2023 NONE  0 - 5 /HPF Final    Squamous Epithelial Cells, Urine 08/24/2023 <1  /HPF Final    Mucus, Urine 08/24/2023 1+  /LPF Final    Hyaline Casts, Urine 08/24/2023 OCC (A)  /LPF Final        Assessment/Plan     Problem List Items Addressed This Visit       Mixed hyperlipidemia    Relevant Orders    CBC and Auto Differential    Comprehensive Metabolic Panel    Hemoglobin A1C    Lipid Panel    Nicole syndrome    Relevant Orders    CBC and Auto Differential    Comprehensive Metabolic Panel    Hemoglobin A1C    Lipid Panel    Emphysema lung (CMS/HCC)    Relevant Orders    CBC and Auto Differential    Comprehensive Metabolic Panel    Hemoglobin A1C    Lipid Panel    Prediabetes    Relevant  Orders    POCT glycosylated hemoglobin (Hb A1C) manually resulted    CBC and Auto Differential    Comprehensive Metabolic Panel    Hemoglobin A1C    Lipid Panel     Other Visit Diagnoses       Encounter for screening mammogram for malignant neoplasm of breast        Relevant Orders    BI mammo bilateral screening tomosynthesis    CBC and Auto Differential    Comprehensive Metabolic Panel    Hemoglobin A1C    Lipid Panel    Need for vaccination        Relevant Orders    Pfizer COVID-19 vaccine, [id number], monovalent, age 12 years and older (30 mcg/0.3 mL)    CBC and Auto Differential    Comprehensive Metabolic Panel    Hemoglobin A1C    Lipid Panel            Follow up at next scheduled visit 6mo  Mood is stable  Off zoloft   Still grieving  Labs first then appt  Will up metformin to 1pill qod 1/2 every other day   May NOT be taking every day -forgets  Pap next time

## 2024-02-28 ENCOUNTER — LAB (OUTPATIENT)
Dept: LAB | Facility: LAB | Age: 54
End: 2024-02-28
Payer: COMMERCIAL

## 2024-02-28 DIAGNOSIS — R73.03 PREDIABETES: ICD-10-CM

## 2024-02-28 DIAGNOSIS — Q96.9 TURNER SYNDROME (HHS-HCC): ICD-10-CM

## 2024-02-28 DIAGNOSIS — E53.8 VITAMIN B12 DEFICIENCY: ICD-10-CM

## 2024-02-28 DIAGNOSIS — Z23 NEED FOR VACCINATION: ICD-10-CM

## 2024-02-28 DIAGNOSIS — Z12.31 ENCOUNTER FOR SCREENING MAMMOGRAM FOR MALIGNANT NEOPLASM OF BREAST: ICD-10-CM

## 2024-02-28 DIAGNOSIS — E78.2 MIXED HYPERLIPIDEMIA: ICD-10-CM

## 2024-02-28 DIAGNOSIS — J43.8 OTHER EMPHYSEMA (MULTI): ICD-10-CM

## 2024-02-28 LAB
ALBUMIN SERPL BCP-MCNC: 4.1 G/DL (ref 3.4–5)
ALP SERPL-CCNC: 112 U/L (ref 33–110)
ALT SERPL W P-5'-P-CCNC: 16 U/L (ref 7–45)
ANION GAP SERPL CALC-SCNC: 10 MMOL/L (ref 10–20)
AST SERPL W P-5'-P-CCNC: 16 U/L (ref 9–39)
BASOPHILS # BLD AUTO: 0.06 X10*3/UL (ref 0–0.1)
BASOPHILS NFR BLD AUTO: 0.7 %
BILIRUB SERPL-MCNC: 0.3 MG/DL (ref 0–1.2)
BUN SERPL-MCNC: 13 MG/DL (ref 6–23)
CALCIUM SERPL-MCNC: 9.1 MG/DL (ref 8.6–10.3)
CHLORIDE SERPL-SCNC: 106 MMOL/L (ref 98–107)
CHOLEST SERPL-MCNC: 149 MG/DL (ref 0–199)
CHOLESTEROL/HDL RATIO: 3.5
CO2 SERPL-SCNC: 29 MMOL/L (ref 21–32)
CREAT SERPL-MCNC: 0.51 MG/DL (ref 0.5–1.05)
EGFRCR SERPLBLD CKD-EPI 2021: >90 ML/MIN/1.73M*2
EOSINOPHIL # BLD AUTO: 0.14 X10*3/UL (ref 0–0.7)
EOSINOPHIL NFR BLD AUTO: 1.7 %
ERYTHROCYTE [DISTWIDTH] IN BLOOD BY AUTOMATED COUNT: 13.1 % (ref 11.5–14.5)
EST. AVERAGE GLUCOSE BLD GHB EST-MCNC: 120 MG/DL
GLUCOSE SERPL-MCNC: 92 MG/DL (ref 74–99)
HBA1C MFR BLD: 5.8 %
HCT VFR BLD AUTO: 39.1 % (ref 36–46)
HDLC SERPL-MCNC: 42 MG/DL
HGB BLD-MCNC: 12.4 G/DL (ref 12–16)
IMM GRANULOCYTES # BLD AUTO: 0.02 X10*3/UL (ref 0–0.7)
IMM GRANULOCYTES NFR BLD AUTO: 0.2 % (ref 0–0.9)
LDLC SERPL CALC-MCNC: 79 MG/DL
LYMPHOCYTES # BLD AUTO: 1.51 X10*3/UL (ref 1.2–4.8)
LYMPHOCYTES NFR BLD AUTO: 18 %
MCH RBC QN AUTO: 28.4 PG (ref 26–34)
MCHC RBC AUTO-ENTMCNC: 31.7 G/DL (ref 32–36)
MCV RBC AUTO: 90 FL (ref 80–100)
MONOCYTES # BLD AUTO: 0.54 X10*3/UL (ref 0.1–1)
MONOCYTES NFR BLD AUTO: 6.4 %
NEUTROPHILS # BLD AUTO: 6.12 X10*3/UL (ref 1.2–7.7)
NEUTROPHILS NFR BLD AUTO: 73 %
NON HDL CHOLESTEROL: 107 MG/DL (ref 0–149)
NRBC BLD-RTO: 0 /100 WBCS (ref 0–0)
PLATELET # BLD AUTO: 275 X10*3/UL (ref 150–450)
POTASSIUM SERPL-SCNC: 4.3 MMOL/L (ref 3.5–5.3)
PROT SERPL-MCNC: 6.3 G/DL (ref 6.4–8.2)
RBC # BLD AUTO: 4.36 X10*6/UL (ref 4–5.2)
SODIUM SERPL-SCNC: 141 MMOL/L (ref 136–145)
TRIGL SERPL-MCNC: 139 MG/DL (ref 0–149)
VIT B12 SERPL-MCNC: 305 PG/ML (ref 211–911)
VLDL: 28 MG/DL (ref 0–40)
WBC # BLD AUTO: 8.4 X10*3/UL (ref 4.4–11.3)

## 2024-02-28 PROCEDURE — 36415 COLL VENOUS BLD VENIPUNCTURE: CPT

## 2024-02-28 PROCEDURE — 85025 COMPLETE CBC W/AUTO DIFF WBC: CPT

## 2024-02-28 PROCEDURE — 83036 HEMOGLOBIN GLYCOSYLATED A1C: CPT

## 2024-02-28 PROCEDURE — 80053 COMPREHEN METABOLIC PANEL: CPT

## 2024-02-28 PROCEDURE — 82607 VITAMIN B-12: CPT

## 2024-02-28 PROCEDURE — 80061 LIPID PANEL: CPT

## 2024-04-01 ENCOUNTER — OFFICE VISIT (OUTPATIENT)
Dept: CARDIOLOGY | Facility: CLINIC | Age: 54
End: 2024-04-01
Payer: COMMERCIAL

## 2024-04-01 VITALS
SYSTOLIC BLOOD PRESSURE: 126 MMHG | BODY MASS INDEX: 23.81 KG/M2 | DIASTOLIC BLOOD PRESSURE: 70 MMHG | WEIGHT: 130.2 LBS | HEART RATE: 63 BPM

## 2024-04-01 DIAGNOSIS — I49.1 PREMATURE ATRIAL CONTRACTIONS: Primary | ICD-10-CM

## 2024-04-01 DIAGNOSIS — I70.90 ATHEROSCLEROSIS: ICD-10-CM

## 2024-04-01 DIAGNOSIS — R00.2 PALPITATIONS: ICD-10-CM

## 2024-04-01 PROCEDURE — 99213 OFFICE O/P EST LOW 20 MIN: CPT | Performed by: INTERNAL MEDICINE

## 2024-04-01 PROCEDURE — 1036F TOBACCO NON-USER: CPT | Performed by: INTERNAL MEDICINE

## 2024-04-01 RX ORDER — METOPROLOL SUCCINATE 25 MG/1
25 TABLET, EXTENDED RELEASE ORAL DAILY
Qty: 90 TABLET | Refills: 3 | Status: SHIPPED | OUTPATIENT
Start: 2024-04-01 | End: 2025-04-01

## 2024-04-01 NOTE — PROGRESS NOTES
Patient:  Gissel Borrero  YOB: 1970  MRN: [id number]       HPI:       Gissel Borrero is a 53 y.o. female who returns today for cardiac follow-up.  She was seen on 2023 for complaints of palpitations. She does not have any history of atherosclerotic heart or valvular heart disease. She was incidentally noted on abdominal CT scan of the abdomen in 2022 to have some mild scattered atherosclerosis. She has a history of hyperlipidemia for which she has been maintained on atorvastatin for the past year or so. No history of hypertension or diabetes mellitus. She has never smoked. No family history of premature coronary artery disease. Her grandmother  of myocardial infarction at the age of 85.     At the time of her visit she noted being under a significant amount of stress.  Her  had  due to complications from COVID. There were some other deaths in her family. She was having some intermittent brief episodes of fluttering in her chest.  She mostly noticed these when first getting into bed in the evening. They were not lasting only a few seconds or so. She sometimes felt a vague sensation in her chest that she has difficulty characterizing. She thinks it might be anxiety related.      A coronary calcium score on 2023 was mildly increased at 16 (LO 86 percentile for age, gender, and race in asymptomatic patients).  A 24-hour Holter monitor on 2023 showed normal sinus rhythm with an average heart rate 70 bpm. Heart rates ranged from 51 bpm to 128 bpm. There were 19,000 PACs comprising 19% of the total rhythm. There were 22 PVCs. No significant bradycardia or tachyarrhythmias.  Previous echocardiogram September 10, 2021 showed an estimated LV ejection fraction 50 to 55%.  Trivial mitral and tricuspid regurgitation.     At the time of her visit April 10, 2023 she noted intermittent hard pounding or fluttering sensations in her chest.   None were prolonged.  She had been started on metformin and had about 3 days of nausea, vomiting, and diarrhea.  Because of the frequency of PACs she was started on Toprol-XL 25 mg daily.    She currently is feeling better.  She notes a decrease in palpitations since going on the Toprol-XL.  She continues to work full-time at a [employer].  She denies any shortness breath. She denies any orthopnea, PND, or increasing peripheral edema. She denies any lightheadedness, near-syncope, or syncope. She denies any fever, chills, or cough. She denies any nausea, vomiting, or diaphoresis. She denies any hemoptysis, hematemesis, melena, or hematochezia. Lab studies dated January 27, 2023 showed a normal CBC and CMP with exception of alkaline phosphatase 120. Cholesterol 209 with , HDL 58, and triglycerides 132. Hemoglobin A1c  5.9. EKG done the same day showed normal sinus rhythm with poor R wave progression and left axis deviation.  Lab studies dated February 28, 2024 showed a normal CBC and CMP with exception of alkaline phosphatase 112.  Hemoglobin A1c 5.8.  Cholesterol 149 with HDL 42, LDL 79, and triglycerides 139.  She will be continued on her same dose of Toprol-XL.  Her blood pressures are well-controlled.  No anginal or CHF symptoms.  Other details as noted below.     The above portion of this note was dictated by me using voice recognition software. I personally performed the services described in the documentation. The scribe entering the documentation below was in my presence. I affirm that the information is both accurate and complete.       Objective:     Vitals:    04/01/24 1417   BP: 126/70   Pulse: 63       Wt Readings from Last 4 Encounters:   02/21/24 58.5 kg (129 lb)   08/21/23 58.1 kg (128 lb)   04/10/23 57.2 kg (126 lb)   04/04/23 59 kg (130 lb)       Allergies:     No Known Allergies     Medications:     Current Outpatient Medications   Medication Instructions    atorvastatin (LIPITOR) 40 mg, oral, Nightly     diclofenac sodium (Voltaren) 1 % gel gel Apply small amount externally to the affected area twice daily as needed for pain    metFORMIN (GLUCOPHAGE) 500 mg, oral, Daily, take with food    metoprolol succinate XL (TOPROL-XL) 25 mg, oral, Daily    mometasone (Elocon) 0.1 % ointment APPLY SPARINGLY TO AFFECTED AREA(S) ONCE DAILY       Physical Examination:   GENERAL:  Well developed, well nourished, in no acute distress.  CHEST:  Symmetric and nontender.  NEURO/PSYCH:  Alert and oriented times three with approppriate behavior and responses.  NECK:  Supple, no JVD, no bruit.  LUNGS:  Clear to auscultation bilaterally, normal respiratory effort.  HEART:  Rate and rhythm regular with no evident murmur, no gallop appreciated.        There are no rubs, clicks or heaves.  EXTREMITIES:  Warm with good color, no clubbing or cyanosis.  There is no edema noted.  PERIPHERAL VASCULAR:  Pulses present and equally palpable; 2+ throughout.      Lab:     CBC:   Lab Results   Component Value Date    WBC 8.4 02/28/2024    RBC 4.36 02/28/2024    HGB 12.4 02/28/2024    HCT 39.1 02/28/2024     02/28/2024        CMP:    Lab Results   Component Value Date     02/28/2024    K 4.3 02/28/2024     02/28/2024    CO2 29 02/28/2024    BUN 13 02/28/2024    CREATININE 0.51 02/28/2024    GLUCOSE 92 02/28/2024    CALCIUM 9.1 02/28/2024       Magnesium:    Lab Results   Component Value Date    MG 1.71 04/13/2023       Lipid Profile:    Lab Results   Component Value Date    TRIG 139 02/28/2024    HDL 42.0 02/28/2024    LDLCALC 79 02/28/2024       TSH:    Lab Results   Component Value Date    TSH 0.80 04/13/2023       PT/INR:    Lab Results   Component Value Date    PROTIME 11.3 11/03/2022    INR 1.0 11/03/2022       HgBA1c:    Lab Results   Component Value Date    HGBA1C 5.8 (H) 02/28/2024       BMP:  Lab Results   Component Value Date     02/28/2024     08/17/2023     04/13/2023     01/27/2023    K 4.3  "02/28/2024    K 4.0 08/17/2023    K 3.7 04/13/2023    K 4.2 01/27/2023     02/28/2024     08/17/2023     04/13/2023     01/27/2023    CO2 29 02/28/2024    CO2 28 08/17/2023    CO2 30 04/13/2023    CO2 27 01/27/2023    BUN 13 02/28/2024    BUN 15 08/17/2023    BUN 13 04/13/2023    BUN 15 01/27/2023    CREATININE 0.51 02/28/2024    CREATININE 0.68 08/17/2023    CREATININE 0.61 04/13/2023    CREATININE 0.59 01/27/2023       CBC:  Lab Results   Component Value Date    WBC 8.4 02/28/2024    WBC 6.7 08/17/2023    WBC 5.9 01/27/2023    WBC 8.0 11/03/2022    RBC 4.36 02/28/2024    RBC 4.54 08/17/2023    RBC 4.47 01/27/2023    RBC 4.55 11/03/2022    HGB 12.4 02/28/2024    HGB 12.9 08/17/2023    HGB 12.5 01/27/2023    HGB 12.6 11/03/2022    HCT 39.1 02/28/2024    HCT 40.7 08/17/2023    HCT 39.8 01/27/2023    HCT 40.3 11/03/2022    MCV 90 02/28/2024    MCV 90 08/17/2023    MCV 89 01/27/2023    MCV 89 11/03/2022    MCH 28.4 02/28/2024    MCHC 31.7 (L) 02/28/2024    MCHC 31.7 (L) 08/17/2023    MCHC 31.4 (L) 01/27/2023    MCHC 31.3 (L) 11/03/2022    RDW 13.1 02/28/2024    RDW 14.1 08/17/2023    RDW 14.3 01/27/2023    RDW 14.0 11/03/2022     02/28/2024     08/17/2023     01/27/2023     11/03/2022       Cardiac Enzymes:    No results found for: \"TROPHS\"    Hepatic Function Panel:    Lab Results   Component Value Date    ALKPHOS 112 (H) 02/28/2024    ALT 16 02/28/2024    AST 16 02/28/2024    PROT 6.3 (L) 02/28/2024    BILITOT 0.3 02/28/2024       Problem List:     Patient Active Problem List   Diagnosis    Eczema    Elevated LFTs    Gastroesophageal reflux disease without esophagitis    Grief reaction    Hot flash, menopausal    Mixed hyperlipidemia    Nicole syndrome    Urachal sinus    Atherosclerosis    Emphysema lung (CMS/HCC)    Kidney lesion    Prediabetes    B12 deficiency    Gonadal dysgenesis    Migraines    Umbilical discharge    Vitamin D deficiency       Asessment: "     Problem List Items Addressed This Visit             ICD-10-CM    Atherosclerosis I70.90    Relevant Medications    metoprolol succinate XL (Toprol-XL) 25 mg 24 hr tablet    Other Relevant Orders    Follow Up In Cardiology    Premature atrial contractions - Primary I49.1    Relevant Medications    metoprolol succinate XL (Toprol-XL) 25 mg 24 hr tablet    Palpitations R00.2

## 2024-04-18 ENCOUNTER — APPOINTMENT (OUTPATIENT)
Dept: RADIOLOGY | Facility: CLINIC | Age: 54
End: 2024-04-18
Payer: COMMERCIAL

## 2024-04-25 ENCOUNTER — HOSPITAL ENCOUNTER (OUTPATIENT)
Dept: RADIOLOGY | Facility: CLINIC | Age: 54
Discharge: HOME | End: 2024-04-25
Payer: COMMERCIAL

## 2024-04-25 VITALS — HEIGHT: 62 IN | BODY MASS INDEX: 23.92 KG/M2 | WEIGHT: 130 LBS

## 2024-04-25 DIAGNOSIS — Z12.31 ENCOUNTER FOR SCREENING MAMMOGRAM FOR MALIGNANT NEOPLASM OF BREAST: ICD-10-CM

## 2024-04-25 PROCEDURE — 77067 SCR MAMMO BI INCL CAD: CPT | Performed by: STUDENT IN AN ORGANIZED HEALTH CARE EDUCATION/TRAINING PROGRAM

## 2024-04-25 PROCEDURE — 77063 BREAST TOMOSYNTHESIS BI: CPT | Performed by: STUDENT IN AN ORGANIZED HEALTH CARE EDUCATION/TRAINING PROGRAM

## 2024-04-25 PROCEDURE — 77067 SCR MAMMO BI INCL CAD: CPT

## 2024-05-10 DIAGNOSIS — E78.2 MIXED HYPERLIPIDEMIA: ICD-10-CM

## 2024-05-10 RX ORDER — ATORVASTATIN CALCIUM 40 MG/1
40 TABLET, FILM COATED ORAL NIGHTLY
Qty: 90 TABLET | Refills: 2 | Status: SHIPPED | OUTPATIENT
Start: 2024-05-10 | End: 2024-05-10 | Stop reason: WASHOUT

## 2024-08-21 ENCOUNTER — APPOINTMENT (OUTPATIENT)
Dept: PRIMARY CARE | Facility: CLINIC | Age: 54
End: 2024-08-21
Payer: COMMERCIAL

## 2024-12-04 ENCOUNTER — APPOINTMENT (OUTPATIENT)
Dept: PRIMARY CARE | Facility: CLINIC | Age: 54
End: 2024-12-04
Payer: COMMERCIAL

## 2024-12-04 VITALS
OXYGEN SATURATION: 96 % | SYSTOLIC BLOOD PRESSURE: 126 MMHG | DIASTOLIC BLOOD PRESSURE: 76 MMHG | RESPIRATION RATE: 16 BRPM | WEIGHT: 125 LBS | HEART RATE: 74 BPM | BODY MASS INDEX: 23 KG/M2 | TEMPERATURE: 97 F | HEIGHT: 62 IN

## 2024-12-04 DIAGNOSIS — Z12.4 CERVICAL CANCER SCREENING: ICD-10-CM

## 2024-12-04 DIAGNOSIS — N95.2 VAGINAL ATROPHY: Primary | ICD-10-CM

## 2024-12-04 DIAGNOSIS — Q96.9: ICD-10-CM

## 2024-12-04 DIAGNOSIS — J43.8 OTHER EMPHYSEMA (MULTI): ICD-10-CM

## 2024-12-04 DIAGNOSIS — Z23 NEED FOR VACCINATION: ICD-10-CM

## 2024-12-04 DIAGNOSIS — E78.2 MIXED HYPERLIPIDEMIA: ICD-10-CM

## 2024-12-04 DIAGNOSIS — H66.93 ACUTE OTITIS MEDIA, BILATERAL: ICD-10-CM

## 2024-12-04 DIAGNOSIS — E53.8 B12 DEFICIENCY: ICD-10-CM

## 2024-12-04 DIAGNOSIS — Q96.9 TURNER SYNDROME (HHS-HCC): ICD-10-CM

## 2024-12-04 DIAGNOSIS — R73.03 PREDIABETES: ICD-10-CM

## 2024-12-04 DIAGNOSIS — L30.9 ECZEMA, UNSPECIFIED TYPE: ICD-10-CM

## 2024-12-04 LAB — POC HEMOGLOBIN A1C: 6.2 % (ref 4.2–6.5)

## 2024-12-04 PROCEDURE — 1036F TOBACCO NON-USER: CPT | Performed by: FAMILY MEDICINE

## 2024-12-04 PROCEDURE — 3008F BODY MASS INDEX DOCD: CPT | Performed by: FAMILY MEDICINE

## 2024-12-04 PROCEDURE — 87624 HPV HI-RISK TYP POOLED RSLT: CPT

## 2024-12-04 PROCEDURE — 83036 HEMOGLOBIN GLYCOSYLATED A1C: CPT | Performed by: FAMILY MEDICINE

## 2024-12-04 PROCEDURE — 90480 ADMN SARSCOV2 VAC 1/ONLY CMP: CPT | Performed by: FAMILY MEDICINE

## 2024-12-04 PROCEDURE — 99396 PREV VISIT EST AGE 40-64: CPT | Performed by: FAMILY MEDICINE

## 2024-12-04 PROCEDURE — 91322 SARSCOV2 VAC 50 MCG/0.5ML IM: CPT | Performed by: FAMILY MEDICINE

## 2024-12-04 RX ORDER — FLUTICASONE PROPIONATE 50 MCG
1 SPRAY, SUSPENSION (ML) NASAL DAILY
Qty: 16 G | Refills: 5 | Status: SHIPPED | OUTPATIENT
Start: 2024-12-04 | End: 2024-12-06

## 2024-12-04 RX ORDER — AMOXICILLIN 875 MG/1
875 TABLET, FILM COATED ORAL 2 TIMES DAILY
Qty: 20 TABLET | Refills: 0 | Status: SHIPPED | OUTPATIENT
Start: 2024-12-04 | End: 2024-12-14

## 2024-12-04 RX ORDER — ESTRADIOL 0.1 MG/G
2 CREAM VAGINAL 2 TIMES WEEKLY
Qty: 42.5 G | Refills: 2 | Status: SHIPPED | OUTPATIENT
Start: 2024-12-05 | End: 2025-12-05

## 2024-12-04 RX ORDER — MOMETASONE FUROATE 1 MG/G
OINTMENT TOPICAL
Qty: 45 G | Refills: 3 | Status: SHIPPED | OUTPATIENT
Start: 2024-12-04

## 2024-12-04 NOTE — PROGRESS NOTES
Gissel Borrero 54 y.o. female presents today for pap smear     Covid vax: UTD  Flu: declined  Shingles: declined     CRC: declines  Mammogram: 4/2024  Pap: 2019-today  Lmp: n/a      Past Medical History:   Diagnosis Date    Colon cancer screening declined 03/2023    COVID-19     History of mammogram 04/25/2024    cat 1    Pap test, as part of routine gynecological examination 07/2019    wnl, hpv neg      Past Surgical History:   Procedure Laterality Date    PELVIC LAPAROSCOPY        No Known Allergies   Current Outpatient Medications   Medication Sig Dispense Refill    atorvastatin (Lipitor) 40 mg tablet TAKE 1 TABLET BY MOUTH EVERYDAY AT BEDTIME 90 tablet 3    metFORMIN (Glucophage) 500 mg tablet TAKE 1 TABLET BY MOUTH EVERY DAY WITH A MEAL (Patient taking differently: Take 1 tablet (500 mg) by mouth once daily. 1 TABLET M & Friday OTHER DAYS 1/2 TABLET) 90 tablet 3    metoprolol succinate XL (Toprol-XL) 25 mg 24 hr tablet Take 1 tablet (25 mg) by mouth once daily. 90 tablet 3    [START ON 12/5/2024] estradiol (Estrace) 0.01 % (0.1 mg/gram) vaginal cream Insert 0.5 Applicatorfuls (2 g) into the vagina 2 times a week. Apply to vagina nightly for 1 week then every Monday/Wednesday/Friday. 42.5 g 2    mometasone (Elocon) 0.1 % ointment APPLY SPARINGLY TO AFFECTED AREA(S) ONCE DAILY 45 g 3     No current facility-administered medications for this visit.      Social History     Tobacco Use    Smoking status: Never    Smokeless tobacco: Never   Substance Use Topics    Alcohol use: Never        The patient reports   No Fever/chills  No Nausea/vomiting/diarrhea  No CVA tenderness  No Confusion   No Paresthesias nor headache today  No Chest pains or shortness of breath           Objective:  Vitals:    12/04/24 1425   BP: 126/76   Pulse: 74   Resp: 16   Temp: 36.1 °C (97 °F)   SpO2: 96%      General:  alert, oriented, no acute distress.  No obvious skin rashes noted.   No gait disturbance noted.    Mood is pleasant, not  tearful, no signs of emotional distress.  Not appearing intoxicated or altered.   No voiced delusions,   Normal, appropriate behavior.    HEENT: Normocephalic, atraumatic,   Pupils round, reactive to light  Extraocular motions intact and wnl  Tms red and bulgy bilat    Mild max sinus tender bilat  Neck: no nuchal rigidity  No masses palpable.  No carotid bruits.  No thyromegaly.    Respiratory: Equal breath sounds  No wheezes,    rales,    nor rhonchi  No respiratory distress.    Heart: Regular rate and rhythm, no    murmurs  no rubs/gallops    Abdomen: no masses palpable, no rebound nor guarding, no rebound nor guarding.    Extremities: NO cyanosis noted, no clubbing.   No edema noted.  2+dorsalis pedis pulses.    Normal-not antalgic, steady gait.    Breast-No concerning lumps, nipple discharge, concerning skin changes or dimpling, or palpable adenopathy noted on full breast exam.    Pelvic-No cervical motion tenderness,  some atrophy noted and narrow introitus-kids speculum used    No concerning vaginal discharge  No vaginal vault or labial abnormalities noted  Normal perineal appearance  Patient tolerated pap sans difficulty or complaints    Medical assistant present as witness for pelvic exam    Assessment/Plan     Problem List Items Addressed This Visit       Eczema    Relevant Medications    mometasone (Elocon) 0.1 % ointment    Other Relevant Orders    Comprehensive Metabolic Panel    CBC and Auto Differential    Hemoglobin A1C    Lipid Panel    Thyroid Stimulating Hormone    Thyroxine, Free    Mixed hyperlipidemia    Relevant Orders    Comprehensive Metabolic Panel    CBC and Auto Differential    Hemoglobin A1C    Lipid Panel    Thyroid Stimulating Hormone    Thyroxine, Free    Nicole syndrome (HHS-HCC)    Relevant Orders    Comprehensive Metabolic Panel    CBC and Auto Differential    Hemoglobin A1C    Lipid Panel    Thyroid Stimulating Hormone    Thyroxine, Free    Emphysema lung (Multi)    Relevant Orders     Comprehensive Metabolic Panel    CBC and Auto Differential    Hemoglobin A1C    Lipid Panel    Thyroid Stimulating Hormone    Thyroxine, Free    Prediabetes    Relevant Orders    POCT glycosylated hemoglobin (Hb A1C) manually resulted    Comprehensive Metabolic Panel    CBC and Auto Differential    Hemoglobin A1C    Lipid Panel    Thyroid Stimulating Hormone    Thyroxine, Free    B12 deficiency    Relevant Orders    Comprehensive Metabolic Panel    CBC and Auto Differential    Hemoglobin A1C    Lipid Panel    Thyroid Stimulating Hormone    Thyroxine, Free    Gonadal dysgenesis (HHS-HCC)    Relevant Orders    Comprehensive Metabolic Panel    CBC and Auto Differential    Hemoglobin A1C    Lipid Panel    Thyroid Stimulating Hormone    Thyroxine, Free     Other Visit Diagnoses       Vaginal atrophy    -  Primary    Relevant Medications    estradiol (Estrace) 0.01 % (0.1 mg/gram) vaginal cream (Start on 12/5/2024)    Cervical cancer screening        Relevant Orders    THINPREP PAP TEST    Comprehensive Metabolic Panel    CBC and Auto Differential    Hemoglobin A1C    Lipid Panel    Thyroid Stimulating Hormone    Thyroxine, Free    Need for vaccination        Relevant Orders    Moderna COVID-19 vaccine, monovalent, age 12 years and older, (50mcg/0.5mL)(Spikevax) (Completed)    Comprehensive Metabolic Panel    CBC and Auto Differential    Hemoglobin A1C    Lipid Panel    Thyroid Stimulating Hormone    Thyroxine, Free          Some sinus issues-add claritin  Amoxil  The risks, benefits, and alternatives for the antibiotic prescribed were discussed with patient as well as possible side effects. If ANY signs or symptoms of allergic reaction patient is to discontinue medicine immediately and call us or GO TO ER if tongue swelling/respiratory issues or significant effects.  It has already been determined that the benefits outweigh the risks of an antibiotic for you at this time, unless otherwise indicated.  Antibiotics are  usually meant to be taken to completion as advised and it is usual course for symptoms to IMPROVE while taking-if symptoms worsen or new problems arise, we should be notified or medical evaluation should occur. Persistent side effects such as diarrhea especially after antibiotic discontinuation are unusual, and should prompt assessment and evaluation either in office or ER depending on severity. Rash, and/or other symptoms of concern should also cause evaluation.  Complete resolution of original symptoms are expected and we should be informed if this does not occur.      Patient is aware to follow up based on pap results for next pap according to age related guidelines and sooner if abnormality detected. For certain abnormalities patient is aware to follow up with Gynecology physician.  Patient should receive pap results within 2-3 weeks and if they are NOT received, she knows to call office to find results as this is not usual course of action.  Low carb diet  All issues stable  Premarin crm pt reqs but does have fmh  Will just have minimal use This medications risks, benefits, and alternatives were discussed with patient at length.  If any unwanted side effects occur-discontinue medicine and call the office for discussion.      Florecita Munoz MD

## 2024-12-04 NOTE — PROGRESS NOTES
Covid vax: UTD  Flu: declined  Shingles: declined    CRC: declines  Mammogram: 4/2024  Pap: 2019-today  Lmp: n/a

## 2024-12-04 NOTE — PATIENT INSTRUCTIONS
Estrogen cream at entrance only to vagina and dime size 1-2/wk  Amoxil for 7 days or 10 if needed for sinuses  Claritin for 2-4wks  And may want flonase

## 2024-12-05 DIAGNOSIS — H66.93 ACUTE OTITIS MEDIA, BILATERAL: ICD-10-CM

## 2024-12-06 RX ORDER — AZELASTINE 1 MG/ML
2 SPRAY, METERED NASAL 2 TIMES DAILY
Qty: 2 ML | Refills: 3 | Status: SHIPPED | OUTPATIENT
Start: 2024-12-06

## 2024-12-17 LAB
CYTOLOGY CMNT CVX/VAG CYTO-IMP: NORMAL
HPV HR 12 DNA GENITAL QL NAA+PROBE: NEGATIVE
HPV HR GENOTYPES PNL CVX NAA+PROBE: NEGATIVE
HPV16 DNA SPEC QL NAA+PROBE: NEGATIVE
HPV18 DNA SPEC QL NAA+PROBE: NEGATIVE
LAB AP HPV GENOTYPE QUESTION: YES
LAB AP HPV HR: NORMAL
LABORATORY COMMENT REPORT: NORMAL
PATH REPORT.TOTAL CANCER: NORMAL

## 2024-12-26 DIAGNOSIS — H66.93 ACUTE OTITIS MEDIA, BILATERAL: ICD-10-CM

## 2024-12-27 DIAGNOSIS — H66.93 ACUTE OTITIS MEDIA, BILATERAL: ICD-10-CM

## 2024-12-27 RX ORDER — FLUTICASONE PROPIONATE 50 MCG
1 SPRAY, SUSPENSION (ML) NASAL DAILY
Qty: 48 ML | Refills: 2 | Status: SHIPPED | OUTPATIENT
Start: 2024-12-27

## 2024-12-30 RX ORDER — AZELASTINE 1 MG/ML
2 SPRAY, METERED NASAL 2 TIMES DAILY
Qty: 2 ML | Refills: 3 | Status: SHIPPED | OUTPATIENT
Start: 2024-12-30

## 2025-02-01 ENCOUNTER — OFFICE VISIT (OUTPATIENT)
Dept: URGENT CARE | Age: 55
End: 2025-02-01
Payer: COMMERCIAL

## 2025-02-01 VITALS
RESPIRATION RATE: 18 BRPM | WEIGHT: 129 LBS | HEART RATE: 75 BPM | TEMPERATURE: 98.7 F | OXYGEN SATURATION: 95 % | SYSTOLIC BLOOD PRESSURE: 126 MMHG | DIASTOLIC BLOOD PRESSURE: 78 MMHG | HEIGHT: 62 IN | BODY MASS INDEX: 23.74 KG/M2

## 2025-02-01 DIAGNOSIS — R10.9 FLANK PAIN: ICD-10-CM

## 2025-02-01 DIAGNOSIS — M54.50 ACUTE RIGHT-SIDED LOW BACK PAIN WITHOUT SCIATICA: Primary | ICD-10-CM

## 2025-02-01 LAB
POC APPEARANCE, URINE: ABNORMAL
POC BILIRUBIN, URINE: NEGATIVE
POC BLOOD, URINE: NEGATIVE
POC COLOR, URINE: YELLOW
POC GLUCOSE, URINE: NEGATIVE MG/DL
POC KETONES, URINE: NEGATIVE MG/DL
POC LEUKOCYTES, URINE: NEGATIVE
POC NITRITE,URINE: NEGATIVE
POC PH, URINE: 6 PH
POC PROTEIN, URINE: NEGATIVE MG/DL
POC SPECIFIC GRAVITY, URINE: >=1.03
POC UROBILINOGEN, URINE: 0.2 EU/DL

## 2025-02-01 RX ORDER — CYCLOBENZAPRINE HCL 10 MG
10 TABLET ORAL 3 TIMES DAILY PRN
Qty: 15 TABLET | Refills: 0 | Status: SHIPPED | OUTPATIENT
Start: 2025-02-01 | End: 2025-02-06

## 2025-02-01 RX ORDER — METHYLPREDNISOLONE 4 MG/1
TABLET ORAL
Qty: 21 TABLET | Refills: 0 | Status: SHIPPED | OUTPATIENT
Start: 2025-02-01 | End: 2025-02-07

## 2025-02-01 ASSESSMENT — ENCOUNTER SYMPTOMS
NEUROLOGICAL NEGATIVE: 1
EYES NEGATIVE: 1
ALLERGIC/IMMUNOLOGIC NEGATIVE: 1
PALPITATIONS: 0
HEMATOLOGIC/LYMPHATIC NEGATIVE: 1
GASTROINTESTINAL NEGATIVE: 1
RESPIRATORY NEGATIVE: 1
ENDOCRINE NEGATIVE: 1
BACK PAIN: 1
CONSTITUTIONAL NEGATIVE: 1
PSYCHIATRIC NEGATIVE: 1

## 2025-02-01 NOTE — PROGRESS NOTES
Subjective   Patient ID: Gissel Borrero is a 54 y.o. female. They present today with a chief complaint of Back Pain (Lower back pain, no painful urination or itching or burning, back pain when sitting for a while or when waking up in the morning. ).    History of Present Illness    Back Pain  Pertinent negatives include no chest pain.       Pt presents to urgent care with c/o  right-sided low back for the past 10 days.  Patient states she thinks she tweaked something in her back, putting away [employer] decorations.  Reports she noticed the left past few days her urine has been darker in color.  States she is increased her water intake and color has improved.  She denies dysuria, hematuria, flank pain, abdominal pain  .  She denies numbness or tingling.  Denies history of issues with her back.   Reports pain is worse in the morning.  Reports she has been soaking in warm bath water which has provided moderate relief of pain. Pt denies CP, SOB, palpitations, fevers, abd pain, n/v/d, sick contacts, recent travel.        Past Medical History  Allergies as of 02/01/2025    (No Known Allergies)       (Not in a hospital admission)       Past Medical History:   Diagnosis Date    Colon cancer screening declined 03/2023    COVID-19     History of mammogram 04/25/2024    cat 1    Pap test, as part of routine gynecological examination 07/2019    wnl, hpv neg       Past Surgical History:   Procedure Laterality Date    PELVIC LAPAROSCOPY          reports that she has never smoked. She has never used smokeless tobacco. She reports that she does not drink alcohol and does not use drugs.    Review of Systems  Review of Systems   Constitutional: Negative.    HENT: Negative.     Eyes: Negative.    Respiratory: Negative.     Cardiovascular:  Negative for chest pain and palpitations.   Gastrointestinal: Negative.    Endocrine: Negative.    Genitourinary: Negative.    Musculoskeletal:  Positive for back pain.   Skin: Negative.   "  Allergic/Immunologic: Negative.    Neurological: Negative.    Hematological: Negative.    Psychiatric/Behavioral: Negative.     All other systems reviewed and are negative.                                 Objective    Vitals:    02/01/25 1635   BP: 126/78   Pulse: 75   Resp: 18   Temp: 37.1 °C (98.7 °F)   TempSrc: Oral   SpO2: 95%   Weight: 58.5 kg (129 lb)   Height: 1.575 m (5' 2\")     No LMP recorded. Patient is perimenopausal.    Physical Exam  Vitals and nursing note reviewed.   Constitutional:       General: She is not in acute distress.     Appearance: Normal appearance. She is not ill-appearing or toxic-appearing.   HENT:      Head: Atraumatic.      Right Ear: Tympanic membrane, ear canal and external ear normal.      Left Ear: Tympanic membrane, ear canal and external ear normal.      Nose: Nose normal.      Mouth/Throat:      Mouth: Mucous membranes are moist.      Pharynx: Oropharynx is clear. No oropharyngeal exudate or posterior oropharyngeal erythema.   Eyes:      Extraocular Movements: Extraocular movements intact.      Conjunctiva/sclera: Conjunctivae normal.      Pupils: Pupils are equal, round, and reactive to light.   Cardiovascular:      Rate and Rhythm: Normal rate and regular rhythm.      Pulses: Normal pulses.      Heart sounds: Normal heart sounds.   Pulmonary:      Effort: Pulmonary effort is normal.      Breath sounds: Normal breath sounds.   Abdominal:      General: Abdomen is flat. Bowel sounds are normal.      Palpations: Abdomen is soft.      Tenderness: There is no abdominal tenderness.   Musculoskeletal:         General: Normal range of motion.      Cervical back: Normal range of motion and neck supple. No tenderness.        Back:    Skin:     General: Skin is warm and dry.      Capillary Refill: Capillary refill takes less than 2 seconds.   Neurological:      General: No focal deficit present.      Mental Status: She is alert and oriented to person, place, and time.   Psychiatric:   "       Mood and Affect: Mood normal.         Behavior: Behavior normal.         Thought Content: Thought content normal.         Procedures    Point of Care Test & Imaging Results from this visit  Results for orders placed or performed in visit on 02/01/25   POCT UA Automated manually resulted   Result Value Ref Range    POC Color, Urine Yellow Straw, Yellow, Light-Yellow    POC Appearance, Urine Cloudy (A) Clear    POC Glucose, Urine NEGATIVE NEGATIVE mg/dl    POC Bilirubin, Urine NEGATIVE NEGATIVE    POC Ketones, Urine NEGATIVE NEGATIVE mg/dl    POC Specific Gravity, Urine >=1.030 1.005 - 1.035    POC Blood, Urine NEGATIVE NEGATIVE    POC PH, Urine 6.0 No Reference Range Established PH    POC Protein, Urine NEGATIVE NEGATIVE mg/dl    POC Urobilinogen, Urine 0.2 0.2, 1.0 EU/DL    Poc Nitrite, Urine NEGATIVE NEGATIVE    POC Leukocytes, Urine NEGATIVE NEGATIVE      No results found.    Diagnostic study results (if any) were reviewed by LILIANE Munoz.    Assessment/Plan   Allergies, medications, history, and pertinent labs/EKGs/Imaging reviewed by LILIANE Munoz.     Medical Decision Making  Urgent Care Course:   53 yo presents to the  with low back pain.  Patient is afebrile, hemodynamically stable.  Patient denies fever, incontinence, saddle anesthesia, IV drug abuse, cp, sob, ab pain, dysuria, and diarrhea.  Low suspicion for cord compression given patient is without incontinence, saddle anesthesia, IV drug abuse.  UA negative. Patient likely with musculoskeletal pain.  Patient given prescriptions for  Medrol Dosepak, Flexeril.  Patient given low back pain warning signs and will f/u with pcp.        Independent Hx provided by: Patient     Social determinant that may affects healthcare: Pharmacy closed     Pt’s case/impression summarized and discussed with: Patient     Likely Dx given clinical picture: Low back pain, muscle spasm     Although not an exhaustive list of Differential Diagnosis  (though considered), patient’s HPI, PE, and other findings are not suggestive of: Compression fracture, sciatica, ankylosing spondylitis, muscle spasm, UTI, pyelonephritis, muscle strain     Patient at time of discharge was clinically well-appearing and HDS for outpatient management. The patient and/or family was given the opportunity to ask questions prior to discharge, understood my verbal discussion of the plans for treatment, expected course, indications to return to  or go to ED, and the need for timely follow up as directed.       Condition: Stable     Disposition: Discharge      Orders and Diagnoses  Diagnoses and all orders for this visit:  Acute right-sided low back pain without sciatica  -     methylPREDNISolone (Medrol Dospak) 4 mg tablets; Take as directed on package.  -     cyclobenzaprine (Flexeril) 10 mg tablet; Take 1 tablet (10 mg) by mouth 3 times a day as needed for muscle spasms for up to 5 days.  Flank pain  -     POCT UA Automated manually resulted      Medical Admin Record      Patient disposition: Home    Electronically signed by LILIANE Munoz  5:34 PM

## 2025-04-07 ENCOUNTER — APPOINTMENT (OUTPATIENT)
Dept: CARDIOLOGY | Facility: CLINIC | Age: 55
End: 2025-04-07
Payer: COMMERCIAL

## 2025-05-07 ENCOUNTER — APPOINTMENT (OUTPATIENT)
Dept: PRIMARY CARE | Facility: CLINIC | Age: 55
End: 2025-05-07
Payer: COMMERCIAL

## 2025-05-20 ENCOUNTER — APPOINTMENT (OUTPATIENT)
Dept: PRIMARY CARE | Facility: CLINIC | Age: 55
End: 2025-05-20
Payer: COMMERCIAL

## 2025-09-29 ENCOUNTER — APPOINTMENT (OUTPATIENT)
Dept: PRIMARY CARE | Facility: CLINIC | Age: 55
End: 2025-09-29
Payer: COMMERCIAL